# Patient Record
Sex: FEMALE | Race: WHITE | URBAN - METROPOLITAN AREA
[De-identification: names, ages, dates, MRNs, and addresses within clinical notes are randomized per-mention and may not be internally consistent; named-entity substitution may affect disease eponyms.]

---

## 2022-02-01 ENCOUNTER — OFFICE VISIT (OUTPATIENT)
Dept: URBAN - METROPOLITAN AREA CLINIC 52 | Facility: CLINIC | Age: 77
End: 2022-02-01
Payer: MEDICARE

## 2022-02-01 DIAGNOSIS — H25.813 COMBINED FORMS OF AGE-RELATED CATARACT, BILATERAL: Primary | ICD-10-CM

## 2022-02-01 PROCEDURE — 92004 COMPRE OPH EXAM NEW PT 1/>: CPT | Performed by: OPHTHALMOLOGY

## 2022-02-01 ASSESSMENT — INTRAOCULAR PRESSURE
OD: 14
OS: 15
OD: 18
OS: 20

## 2022-02-01 NOTE — IMPRESSION/PLAN
Impression: Combined forms of age-related cataract, bilateral: H25.813. Plan: OK for CEIOL OD then OS in [place] 27, RL2. AIM FAR. Discussed lens options, Standard or Toric - pending JASE. Pt is not a candidate for MF lens. Discussed need for glasses for near vision with standard. Discussed diagnosis of cataracts. Cataracts are limiting vision. Discussed risks, benefits and alternatives to surgery including but not limited to: bleeding, infection, risk of vision loss, loss of the eye, need for other surgery. Patient voiced understanding and wishes to proceed. Recommend JASE. Discussed doing Dexycu at the time of surgery. Will have patient start Ketorolac TID x 3 weeks then discontinue - starting one day prior to surgery.

## 2022-08-04 ENCOUNTER — OFFICE VISIT (OUTPATIENT)
Dept: URBAN - METROPOLITAN AREA CLINIC 52 | Facility: CLINIC | Age: 77
End: 2022-08-04
Payer: MEDICARE

## 2022-08-04 DIAGNOSIS — H25.813 COMBINED FORMS OF AGE-RELATED CATARACT, BILATERAL: Primary | ICD-10-CM

## 2022-08-04 PROCEDURE — 99214 OFFICE O/P EST MOD 30 MIN: CPT | Performed by: OPHTHALMOLOGY

## 2022-08-04 ASSESSMENT — VISUAL ACUITY
OS: 20/50
OD: 20/50

## 2022-08-04 ASSESSMENT — INTRAOCULAR PRESSURE
OS: 18
OD: 15
OD: 18
OS: 15

## 2022-08-04 NOTE — IMPRESSION/PLAN
Impression: Combined forms of age-related cataract, bilateral: H25.813. Plan: Ok for CEIOL OD then OS in [place] 27, RL2. AIM FAR. Discussed lens options, Standard or Toric - pending JASE. Discussed need for glasses for near vision with standard and Toric as well. Discussed diagnosis of cataracts. Cataracts are currently limiting vision. Discussed R/B/A to surgery including but not limited to: bleeding, infection, risk of vision loss, loss of the eye, need for other surgery. Patient voiced understanding and wishes to proceed. Discussed doing Dexycu at the time of surgery. Will have patient start Prolensa QD x 3 weeks then discontinue - starting one day prior to surgery.

## 2022-08-24 ENCOUNTER — TESTING ONLY (OUTPATIENT)
Dept: URBAN - METROPOLITAN AREA CLINIC 52 | Facility: CLINIC | Age: 77
End: 2022-08-24
Payer: MEDICARE

## 2022-08-24 DIAGNOSIS — H25.813 COMBINED FORMS OF AGE-RELATED CATARACT, BILATERAL: Primary | ICD-10-CM

## 2022-08-24 ASSESSMENT — PACHYMETRY
OS: 23.51
OS: 2.82
OD: 2.87
OD: 23.33

## 2022-08-29 ENCOUNTER — OFFICE VISIT (OUTPATIENT)
Dept: URBAN - METROPOLITAN AREA CLINIC 52 | Facility: CLINIC | Age: 77
End: 2022-08-29
Payer: MEDICARE

## 2022-08-29 DIAGNOSIS — H25.813 COMBINED FORMS OF AGE-RELATED CATARACT, BILATERAL: Primary | ICD-10-CM

## 2022-08-29 PROCEDURE — 99214 OFFICE O/P EST MOD 30 MIN: CPT | Performed by: OPHTHALMOLOGY

## 2022-08-29 RX ORDER — BROMFENAC SODIUM 0.7 MG/ML
0.07 % SOLUTION/ DROPS OPHTHALMIC
Qty: 5 | Refills: 1 | Status: ACTIVE
Start: 2022-08-29

## 2022-08-29 ASSESSMENT — INTRAOCULAR PRESSURE
OD: 12
OS: 12

## 2022-08-29 ASSESSMENT — VISUAL ACUITY
OD: 20/50
OS: 20/50

## 2022-08-29 NOTE — IMPRESSION/PLAN
Impression: Combined forms of age-related cataract, bilateral: H25.813. Plan: Ok for CEIOL OS then OD in [place] 27, RL2. AIM FAR with 21.0 right eye and 19.5 left eye. Discussed lens options, Standard or Toric - pending JASE. Discussed need for glasses for near vision with standard and Toric as well. Discussed diagnosis of cataracts. Cataracts are currently limiting vision. Discussed R/B/A to surgery including but not limited to: bleeding, infection, risk of vision loss, loss of the eye, need for other surgery. Patient voiced understanding and wishes to proceed. Discussed doing Dexycu at the time of surgery. Will have patient start Prolensa QD x 3 weeks then discontinue - starting one day prior to surgery.

## 2022-09-06 ENCOUNTER — SURGERY (OUTPATIENT)
Dept: URBAN - METROPOLITAN AREA SURGERY 20 | Facility: SURGERY | Age: 77
End: 2022-09-06
Payer: MEDICARE

## 2022-09-06 PROCEDURE — PR1CP PR1CP: CUSTOM | Performed by: OPHTHALMOLOGY

## 2022-09-06 PROCEDURE — 66984 XCAPSL CTRC RMVL W/O ECP: CPT | Performed by: OPHTHALMOLOGY

## 2022-09-07 ENCOUNTER — POST-OPERATIVE VISIT (OUTPATIENT)
Dept: URBAN - METROPOLITAN AREA CLINIC 52 | Facility: CLINIC | Age: 77
End: 2022-09-07
Payer: MEDICARE

## 2022-09-07 DIAGNOSIS — Z48.810 ENCOUNTER FOR SURGICAL AFTERCARE FOLLOWING SURGERY ON THE SENSE ORGANS: Primary | ICD-10-CM

## 2022-09-07 PROCEDURE — 99024 POSTOP FOLLOW-UP VISIT: CPT | Performed by: OPTOMETRIST

## 2022-09-07 ASSESSMENT — INTRAOCULAR PRESSURE
OS: 14
OD: 12

## 2022-09-07 NOTE — IMPRESSION/PLAN
Impression: S/P Phaco - PC IOL OS - . Encounter for surgical aftercare following surgery on a sense organ  Z48.810. Post operative instructions reviewed - Plan: Discussed findings with patient and re-educated on PO instructions. Instructed patient to call if any significant pain, loss of vision, flashes or new floaters.  --Continue all meds

## 2022-09-15 ENCOUNTER — POST-OPERATIVE VISIT (OUTPATIENT)
Dept: URBAN - METROPOLITAN AREA CLINIC 52 | Facility: CLINIC | Age: 77
End: 2022-09-15
Payer: MEDICARE

## 2022-09-15 DIAGNOSIS — Z48.810 ENCOUNTER FOR SURGICAL AFTERCARE FOLLOWING SURGERY ON A SENSE ORGAN: Primary | ICD-10-CM

## 2022-09-15 PROCEDURE — 99024 POSTOP FOLLOW-UP VISIT: CPT | Performed by: OPHTHALMOLOGY

## 2022-09-15 ASSESSMENT — INTRAOCULAR PRESSURE
OD: 14
OS: 14

## 2022-09-15 NOTE — IMPRESSION/PLAN
Impression: S/P Phaco - PC IOL OS - 8 Days. Encounter for surgical aftercare following surgery on a sense organ  Z48.810.  Excellent post op course   Post operative instructions reviewed - Plan: CONT PROLENSA QD
Therapeutic Intensity

## 2022-09-21 ENCOUNTER — POST-OPERATIVE VISIT (OUTPATIENT)
Dept: URBAN - METROPOLITAN AREA CLINIC 45 | Facility: CLINIC | Age: 77
End: 2022-09-21
Payer: MEDICARE

## 2022-09-21 DIAGNOSIS — Z96.1 PRESENCE OF INTRAOCULAR LENS: Primary | ICD-10-CM

## 2022-09-21 PROCEDURE — 99024 POSTOP FOLLOW-UP VISIT: CPT | Performed by: OPTOMETRIST

## 2022-09-21 ASSESSMENT — INTRAOCULAR PRESSURE
OS: 14
OD: 20

## 2022-09-21 NOTE — IMPRESSION/PLAN
Impression: S/P Phaco - PC IOL OD - 1 Day. Presence of intraocular lens  Z96.1. Excellent post op course   Post operative instructions reviewed - Plan: --Advised patient to use artificial tears for comfort. --Continue Prolensa QD OD.

## 2022-09-28 ENCOUNTER — POST-OPERATIVE VISIT (OUTPATIENT)
Dept: URBAN - METROPOLITAN AREA CLINIC 52 | Facility: CLINIC | Age: 77
End: 2022-09-28
Payer: MEDICARE

## 2022-09-28 DIAGNOSIS — Z96.1 PRESENCE OF INTRAOCULAR LENS: Primary | ICD-10-CM

## 2022-09-28 PROCEDURE — 99024 POSTOP FOLLOW-UP VISIT: CPT | Performed by: OPTOMETRIST

## 2022-09-28 ASSESSMENT — KERATOMETRY
OS: 44.25
OD: 44.38

## 2022-09-28 ASSESSMENT — INTRAOCULAR PRESSURE
OD: 11
OS: 11

## 2022-09-28 NOTE — IMPRESSION/PLAN
Impression: S/P Phaco - PC IOL OD - 8 Days. Presence of intraocular lens  Z96.1. Post operative instructions reviewed - Plan: Discussed findings, healing well. Patient to call if any sudden vision changes, pain, flashes of light, new floaters, or curtain/veil of vision loss. RTC 3wk for PO3 with Dr. Karina Reynoso. *Patient is dissatisfied with visual acuity OD, states she would like vision sharper for distance OD. Discussed allowing more time for lens to settle into place, Dr. Karina Reynoso to consider lens exchange OD. --Continue Prolensa QD. --Advised patient to use artificial tears for comfort.

## 2022-10-17 ENCOUNTER — POST-OPERATIVE VISIT (OUTPATIENT)
Dept: URBAN - METROPOLITAN AREA CLINIC 52 | Facility: CLINIC | Age: 77
End: 2022-10-17
Payer: MEDICARE

## 2022-10-17 DIAGNOSIS — Z96.1 PRESENCE OF INTRAOCULAR LENS: Primary | ICD-10-CM

## 2022-10-17 PROCEDURE — 99024 POSTOP FOLLOW-UP VISIT: CPT | Performed by: OPHTHALMOLOGY

## 2022-10-17 ASSESSMENT — INTRAOCULAR PRESSURE
OS: 10
OD: 14
OD: 10

## 2022-10-17 NOTE — IMPRESSION/PLAN
Impression: S/P Phaco - PC IOL OD - 27 Days. Presence of intraocular lens  Z96.1. Excellent post op course   Post operative instructions reviewed - Both implants set for distance Plan: --Advised patient to use PF artificial tears and genteal gel for comfort/itching.

## 2023-12-14 ENCOUNTER — OFFICE VISIT (OUTPATIENT)
Dept: URBAN - METROPOLITAN AREA CLINIC 52 | Facility: CLINIC | Age: 78
End: 2023-12-14
Payer: MEDICARE

## 2023-12-14 DIAGNOSIS — H43.813 VITREOUS DEGENERATION, BILATERAL: Primary | ICD-10-CM

## 2023-12-14 DIAGNOSIS — Z96.1 PRESENCE OF PSEUDOPHAKIA: ICD-10-CM

## 2023-12-14 PROCEDURE — 99214 OFFICE O/P EST MOD 30 MIN: CPT | Performed by: OPHTHALMOLOGY

## 2023-12-14 RX ORDER — BROMFENAC SODIUM 0.7 MG/ML
0.07 % SOLUTION/ DROPS OPHTHALMIC
Qty: 5 | Refills: 1 | Status: INACTIVE
Start: 2023-12-14 | End: 2023-12-14

## 2023-12-14 ASSESSMENT — INTRAOCULAR PRESSURE
OD: 11
OD: 15
OS: 12
OS: 15